# Patient Record
Sex: FEMALE | Race: WHITE | Employment: FULL TIME | URBAN - METROPOLITAN AREA
[De-identification: names, ages, dates, MRNs, and addresses within clinical notes are randomized per-mention and may not be internally consistent; named-entity substitution may affect disease eponyms.]

---

## 2017-06-03 ENCOUNTER — TELEPHONE (OUTPATIENT)
Dept: INTERNAL MEDICINE CLINIC | Facility: CLINIC | Age: 47
End: 2017-06-03

## 2017-06-03 DIAGNOSIS — Z12.31 VISIT FOR SCREENING MAMMOGRAM: Primary | ICD-10-CM

## 2017-06-05 NOTE — TELEPHONE ENCOUNTER
Per Fausto Segura from [facility], Order need to be Mammo Diagnostic bilateral with US if needed.

## 2017-06-06 NOTE — TELEPHONE ENCOUNTER
Kaycee Holm following up Dr. Yony Patino put in screening but it needs to be Diagnostic Bilateral with US If needed. .. please advise

## 2017-10-19 ENCOUNTER — HOSPITAL ENCOUNTER (OUTPATIENT)
Age: 47
Discharge: HOME | End: 2017-10-19
Payer: COMMERCIAL

## 2017-10-19 VITALS
BODY MASS INDEX: 25 KG/M2 | SYSTOLIC BLOOD PRESSURE: 110 MMHG | TEMPERATURE: 98 F | DIASTOLIC BLOOD PRESSURE: 74 MMHG | OXYGEN SATURATION: 100 % | WEIGHT: 155 LBS | HEART RATE: 64 BPM | RESPIRATION RATE: 20 BRPM

## 2017-10-19 DIAGNOSIS — S05.02XA ABRASION OF LEFT CORNEA, INITIAL ENCOUNTER: Primary | ICD-10-CM

## 2017-10-19 PROCEDURE — 99213 OFFICE O/P EST LOW 20 MIN: CPT

## 2017-10-19 PROCEDURE — 99204 OFFICE O/P NEW MOD 45 MIN: CPT

## 2017-10-19 RX ORDER — ERYTHROMYCIN 5 MG/G
1 OINTMENT OPHTHALMIC EVERY 6 HOURS
Qty: 1 G | Refills: 0 | Status: SHIPPED | OUTPATIENT
Start: 2017-10-19 | End: 2017-10-26

## 2017-10-19 NOTE — ED PROVIDER NOTES
Patient presents with:  Eye Problem      HPI:     Leila Stein is a 41year old female who presents today with a chief complaint of Gentiva inflammation, purulent drainage, and foreign body sensation to the left eye.   Patient reports yesterday she began not 6:00 to the left eye. Will treat patient for corneal abrasion and have her follow-up with primary care provider. Patient instructed to take erythromycin eye ointment as directed. Pt verbalizes plan of care and states understanding.        Orders Placed Th

## 2017-10-20 NOTE — ED NOTES
Phone call from pt stating she is unable to afford cost of medication prescribed by provider last event. Requesting change in medication to one she can afford.  Per Dr Janeen Ospina change mediation to Tobramycin Drops 1-2 drops to affected eye every 2-4 hrs [facility]

## 2022-08-15 ENCOUNTER — HOSPITAL ENCOUNTER (OUTPATIENT)
Age: 52
Discharge: HOME | End: 2022-08-15
Payer: OTHER MISCELLANEOUS

## 2022-08-15 VITALS
OXYGEN SATURATION: 100 % | SYSTOLIC BLOOD PRESSURE: 116 MMHG | RESPIRATION RATE: 20 BRPM | TEMPERATURE: 99 F | HEART RATE: 86 BPM | DIASTOLIC BLOOD PRESSURE: 74 MMHG
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
## 2022-08-15 DIAGNOSIS — Z23 NEED FOR TDAP VACCINATION: ICD-10-CM

## 2022-08-15 PROCEDURE — 90471 IMMUNIZATION ADMIN: CPT | Performed by: NURSE PRACTITIONER

## 2022-08-15 PROCEDURE — 90715 TDAP VACCINE 7 YRS/> IM: CPT | Performed by: NURSE PRACTITIONER

## 2022-08-15 PROCEDURE — 99203 OFFICE O/P NEW LOW 30 MIN: CPT | Performed by: NURSE PRACTITIONER

## 2025-04-03 ENCOUNTER — OFFICE VISIT (OUTPATIENT)
Dept: OCCUPATIONAL MEDICINE | Age: 55
End: 2025-04-03
Attending: NURSE PRACTITIONER

## 2025-04-03 DIAGNOSIS — S83.8X2A SPRAIN OF OTHER LIGAMENT OF LEFT KNEE, INITIAL ENCOUNTER: Primary | ICD-10-CM

## 2025-04-29 ENCOUNTER — OFFICE VISIT (OUTPATIENT)
Dept: PHYSICAL THERAPY | Age: 55
End: 2025-04-29
Attending: NURSE PRACTITIONER
Payer: OTHER MISCELLANEOUS

## 2025-04-29 DIAGNOSIS — S83.8X2A SPRAIN OF OTHER LIGAMENT OF LEFT KNEE, INITIAL ENCOUNTER: Primary | ICD-10-CM

## 2025-04-29 PROCEDURE — 97110 THERAPEUTIC EXERCISES: CPT

## 2025-04-29 PROCEDURE — 97162 PT EVAL MOD COMPLEX 30 MIN: CPT

## 2025-04-30 NOTE — PROGRESS NOTES
LOWER EXTREMITY EVALUATION:     Diagnosis:   Sprain of other ligament of left knee, initial encounter (S83.8X2A) Patient:  Erika Landeros (54 year old, female)        Referring Provider: Martha John  Today's Date   4/30/2025    Precautions:  None   Date of Evaluation: 04/29/25  Next MD visit: -  Date of Surgery: na     PATIENT SUMMARY   Summary of chief complaints: L knee pain since fall on ice 2/12/2025 at work  History of current condition: Pt reports falling on ice at work twisting her L knee.  She also impacted other body parts that weren't problematic for long.  She reports her symptoms have improved mildly over the past few months.  Pain decreases some with knee sleeve.   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  Significant findings include: R ACL reconstruction approx 8 yrs ago  Imaging/Tests: -   Erika  has no past medical history on file.  She  has a past surgical history that includes tonsillectomy (1982); [facility] ACL RANI Maintenance (Right, 2013); and adenoidectomy.    ASSESSMENT  rEika presents to physical therapy evaluation with primary c/o L knee pain since fall on ice 2/12/2025 at work. The results of the objective tests and measures show Weak hips, R knee varus play, L knee stable, but mildly swollen and painful. Functional deficits include but are not limited to Decreased tolerance for prolonged upright activity especially stairs. Signs and symptoms are consistent with diagnosis of Sprain of other ligament of left knee, initial encounter (S83.8X2A). Pt and PT discussed evaluation findings, pathology,  POC and HEP.  Pt voiced understanding and performs HEP correctly without reported pain. Skilled Physical Therapy is medically necessary to address the above impairments and reach functional goals.    OBJECTIVE:    Musculoskeletal  Observation: Fit woman in no apparent distress  Palpation: TTP along L knee joint line med and lat   Accessory Motion: good pat/fem mobility B; R knee varus stress reveals significant joint play vs L.     Edema: minimal L knee   Special Tests:Negative R valgus stress, Lachman and posterior drawer, positive on R for varus joint play; Negative L knee varus and valgus stress tests, negative Lachman and posterior drawer; Negative McMurrays B     ROM and Strength: (* denotes performed with pain)  Hip   ROM MMT (-/5)    R L R L     Flex (L2) WNL's WNL's 5 5     Ext  WNL's WNL's 4 4     Abd WNL's WNL's 4- 4-     ER WNL's WNL's         IR 20 WNL's        ,   Knee   ROM MMT (-/5)    R L R L     Flex WNL's WNL's 5 5     Ext (L3) WNL's WNL's 5 5       Flexibility:  LE Flexibility R L     Hip Flexor         Hamstrings -- (130) -- (130)     ITB         Piriformis         Quads -- (Prone: heel to 2\" from hip) -- (Prone: heel to 2\" from hip)     Gastroc-soleus         Balance and Functional Mobility:  Gait: pt ambulates on level ground with normal mechanics   SLS: R 30 sec,  L 30 sec     Today's Treatment and Response:   Pt education was provided on exam findings, treatment diagnosis, treatment plan, expectations, and prognosis.  Today's Treatment       4/29/2025   LE Treatment   Therapeutic Exercise - Discussed use of PRICE especially end of work day  - Discussed avoiding impact activities in the short term  - Discussed evaluation results especially weak hips for long term need  - SLS ea R, L  - Prone hip ext SLR 30x ea R, L  - Sidelying hip abd SLR 30x ea R, L   Therapeutic Exercise Minutes 25   Evaluation Minutes 20   Total Time Of Timed Procedures 25   Total Time Of Service-Based Procedures 20    Total Treatment Time 45   HEP Texted link to pt and reviewed on her phone:  Access Code: [email or web address]  URL: [email or web address]  Date: 04/29/2025  Prepared by: Kwadwo Pablo    Exercises  - Single Leg Stance  - 7 x weekly - 10 reps - 60 seconds hold  - Prone Hip Extension  - 1 x daily - 7 x weekly - 3 sets - 10 reps  - Sidelying Hip Abduction  - 1 x daily - 7 x weekly - 3 sets - 10 reps        Patient was instructed in and issued a HEP for: Texted link to pt and reviewed on her phone:  Access Code: [email or web address]  URL: [email or web address]  Date: 04/29/2025  Prepared by: Kwadwo Pablo    Exercises  - Single Leg Stance  - 7 x weekly - 10 reps - 60 seconds hold  - Prone Hip Extension  - 1 x daily - 7 x weekly - 3 sets - 10 reps  - Sidelying Hip Abduction  - 1 x daily - 7 x weekly - 3 sets - 10 reps    Charges:  PT EVAL: Moderate Complexity, Ther Ex x2  In agreement with evaluation findings and clinical rationale, this evaluation involved MODERATE COMPLEXITY decision making due to 1-2 personal factors/comorbidities, 3 or more body structures involved/activity limitations, and evolving symptoms as documented in the evaluation.                                                                                                                PLAN OF CARE:    Goals: (to be met in 8 visits)   Pt report min/no pain  Pt report no functional limitations at work or otherwise including climbing stairs  Pt demo independence with HEP  Pt demo MMT B hips 4+/5 all directions     Frequency / Duration: Patient will be seen 2x/week or a total of 8  visits over a 90 day period. Treatment will include: Gait training; Manual Therapy; Neuromuscular Re-education; Therapeutic Activities; Therapeutic Exercise; Home Exercise Program instruction    Education or treatment limitation: None   Rehab Potential: excellent     LEFS Score  LEFS Score: (Patient-Rptd) 67.5 % (4/29/2025  6:15  PM)      Patient/Family/Caregiver was advised of these findings, precautions, and treatment options and has agreed to actively participate in planning and for this course of care.    Thank you for your referral. Please co-sign or sign and return this letter via fax as soon as possible to [phone]. If you have any questions, please contact me at Dept: [phone]    Sincerely,  Electronically signed by therapist: Kwadwo Pablo PT  Physician's certification required: Yes  I certify the need for these services furnished under this plan of treatment and while under my care.    X___________________________________________________ Date____________________    Certification From: 4/30/2025  To:7/29/2025

## 2025-05-01 ENCOUNTER — APPOINTMENT (OUTPATIENT)
Dept: PHYSICAL THERAPY | Age: 55
End: 2025-05-01
Attending: NURSE PRACTITIONER
Payer: OTHER MISCELLANEOUS

## 2025-05-06 ENCOUNTER — OFFICE VISIT (OUTPATIENT)
Dept: PHYSICAL THERAPY | Age: 55
End: 2025-05-06
Attending: NURSE PRACTITIONER
Payer: OTHER MISCELLANEOUS

## 2025-05-06 PROCEDURE — 97110 THERAPEUTIC EXERCISES: CPT

## 2025-05-06 NOTE — PROGRESS NOTES
Patient: Erika Landeros (54 year old, female) Referring Provider:  Insurance:   Diagnosis: Sprain of other ligament of left knee, initial encounter (S83.8X2A) Martha WELLS COMP   Date of Surgery: na Next MD visit:  N/A   Precautions:  None - Referral Information:    Date of Evaluation: Req: 8, Auth: 8, Exp: 4/3/2026    04/29/25 POC Auth Visits:  8       Today's Date   5/6/2025    Subjective  Some fatigue and pain in her knees as she did a lot of stairs today.       Pain: 0/10     Objective  See below treatment grid            Assessment  Good tolerance for new exercises with c/o muscle soreness in quads and hip abd's.    Goals (to be met in 8 visits)   Pt report min/no pain  Pt report no functional limitations at work or otherwise including climbing stairs  Pt demo independence with HEP  Pt demo MMT B hips 4+/5 all directions       Plan  Progress LE/hip strengthening as able.    Treatment Last 4 Visits  Treatment Day: 2       4/29/2025 5/6/2025   LE Treatment   Therapeutic Exercise - Discussed use of PRICE especially end of work day  - Discussed avoiding impact activities in the short term  - Discussed evaluation results especially weak hips for long term need  - SLS ea R, L  - Prone hip ext SLR 30x ea R, L  - Sidelying hip abd SLR 30x ea R, L - Upright bike x 6'  - Stretches on steps: hamstrings, knee flexion, quads  - Shuttle B leg press 6b x30; R/L leg press 5b 3x10 ea  - SLS on level, eyes open ea R, L  - prone hip ext SLR 3x10 ea R, L  - sidelying hip abd SLR 3x10 ea R, L   Therapeutic Exercise Minutes 25 45   Evaluation Minutes 20    Total Time Of Timed Procedures 25 45   Total Time Of Service-Based Procedures 20 0   Total Treatment Time 45 45   HEP Texted link to pt and reviewed on her phone:  Access Code: [email or web address]  URL: [email or web address]  Date: 04/29/2025  Prepared by: Kwadwo Pablo    Exercises  - Single Leg Stance  - 7 x weekly - 10 reps - 60 seconds hold  - Prone Hip Extension   - 1 x daily - 7 x weekly - 3 sets - 10 reps  - Sidelying Hip Abduction  - 1 x daily - 7 x weekly - 3 sets - 10 reps Access Code: [email or web address]  URL: [email or web address]          HEP  Access Code: [email or web address]  URL: [email or web address]      Charges     Ther Ex x3

## 2025-05-08 ENCOUNTER — OFFICE VISIT (OUTPATIENT)
Dept: PHYSICAL THERAPY | Age: 55
End: 2025-05-08
Attending: NURSE PRACTITIONER
Payer: OTHER MISCELLANEOUS

## 2025-05-08 PROCEDURE — 97110 THERAPEUTIC EXERCISES: CPT

## 2025-05-08 NOTE — PROGRESS NOTES
Patient: Erika Landeros (54 year old, female) Referring Provider:  Insurance:   Diagnosis: Sprain of other ligament of left knee, initial encounter (S83.8X2A) Martha WELLS COMP   Date of Surgery: na Next MD visit:  N/A   Precautions:  None - Referral Information:    Date of Evaluation: Req: 8, Auth: 8, Exp: 4/3/2026    04/29/25 POC Auth Visits:  8       Today's Date   5/8/2025    Subjective  Knees doing a little better actually       Pain: 0/10     Objective  See below treatment grid            Assessment  Mild symptom improvement, but still end range pain with L knee flexion.    Goals (to be met in 8 visits)   Pt report min/no pain  Pt report no functional limitations at work or otherwise including climbing stairs  Pt demo independence with HEP  Pt demo MMT B hips 4+/5 all directions         Plan  Progress LE/hip strengthening as able.    Treatment Last 4 Visits  Treatment Day: 3       4/29/2025 5/6/2025 5/8/2025   LE Treatment   Therapeutic Exercise - Discussed use of PRICE especially end of work day  - Discussed avoiding impact activities in the short term  - Discussed evaluation results especially weak hips for long term need  - SLS ea R, L  - Prone hip ext SLR 30x ea R, L  - Sidelying hip abd SLR 30x ea R, L - Upright bike x 6'  - Stretches on steps: hamstrings, knee flexion, quads  - Shuttle B leg press 6b x30; R/L leg press 5b 3x10 ea  - SLS on level, eyes open ea R, L  - prone hip ext SLR 3x10 ea R, L  - sidelying hip abd SLR 3x10 ea R, L - Upright bike x 6'   - Stretches on steps: hamstrings, knee flexion, quads   - Shuttle B leg press 6b x30; R/L leg press 5b 3x10 ea   - SLS on level, eyes open ea R, L   - prone hip ext SLR 3x10 ea R, L   - sidelying hip abd SLR 3x10 ea R, L   - Supine hip flex SLR 3x10 ea R, L   Therapeutic Exercise Minutes 25 45 40   Evaluation Minutes 20     Total Time Of Timed Procedures 25 45 40   Total Time Of Service-Based Procedures 20 0 0   Total Treatment Time 45 45 40   HEP  Texted link to pt and reviewed on her phone:  Access Code: [email or web address]  URL: [email or web address]  Date: 04/29/2025  Prepared by: Kwadwo Pablo    Exercises  - Single Leg Stance  - 7 x weekly - 10 reps - 60 seconds hold  - Prone Hip Extension  - 1 x daily - 7 x weekly - 3 sets - 10 reps  - Sidelying Hip Abduction  - 1 x daily - 7 x weekly - 3 sets - 10 reps Access Code: [email or web address]  URL: [email or web address]           HEP  Access Code: [email or web address]  URL: [email or web address]      Charges     Ther Ex x3

## 2025-05-13 ENCOUNTER — OFFICE VISIT (OUTPATIENT)
Dept: PHYSICAL THERAPY | Age: 55
End: 2025-05-13
Attending: NURSE PRACTITIONER
Payer: OTHER MISCELLANEOUS

## 2025-05-13 PROCEDURE — 97110 THERAPEUTIC EXERCISES: CPT

## 2025-05-13 NOTE — PROGRESS NOTES
Patient: Erika Landeros (54 year old, female) Referring Provider:  Insurance:   Diagnosis: Sprain of other ligament of left knee, initial encounter (S83.8X2A) Martha WELLS COMP   Date of Surgery: na Next MD visit:  N/A   Precautions:  None - Referral Information:    Date of Evaluation: Req: 8, Auth: 8, Exp: 4/3/2026    04/29/25 POC Auth Visits:  8       Today's Date   5/13/2025    Subjective  Continued knee pain, especially at end of work day.       Pain: 0/10     Objective  See below treatment grid            Assessment  Continued mild pain, but tolerating all challenges presented.    Goals (to be met in 8 visits)   Pt report min/no pain  Pt report no functional limitations at work or otherwise including climbing stairs  Pt demo independence with HEP  Pt demo MMT B hips 4+/5 all directions           Plan  Progress LE/hip strengthening as able.    Treatment Last 4 Visits  Treatment Day: 4       4/29/2025 5/6/2025 5/8/2025 5/13/2025   LE Treatment   Therapeutic Exercise - Discussed use of PRICE especially end of work day  - Discussed avoiding impact activities in the short term  - Discussed evaluation results especially weak hips for long term need  - SLS ea R, L  - Prone hip ext SLR 30x ea R, L  - Sidelying hip abd SLR 30x ea R, L - Upright bike x 6'  - Stretches on steps: hamstrings, knee flexion, quads  - Shuttle B leg press 6b x30; R/L leg press 5b 3x10 ea  - SLS on level, eyes open ea R, L  - prone hip ext SLR 3x10 ea R, L  - sidelying hip abd SLR 3x10 ea R, L - Upright bike x 6'   - Stretches on steps: hamstrings, knee flexion, quads   - Shuttle B leg press 6b x30; R/L leg press 5b 3x10 ea   - SLS on level, eyes open ea R, L   - prone hip ext SLR 3x10 ea R, L   - sidelying hip abd SLR 3x10 ea R, L   - Supine hip flex SLR 3x10 ea R, L - Upright bike x 6'   - Stretches on steps: hamstrings, knee flexion, quads   - Shuttle B leg press 6b x30; R/L leg press 5b 3x10 ea   - SLS on level, eyes open ea R, L   -  prone hip ext SLR 3x10 ea R, L   - sidelying hip abd SLR 3x10 ea R, L   - supine hip flex SLR 3x10 ea R, L  - Partial squat (hip tap on mat table) 10x ea at 3 different heights  - Sit-to-Stand R, L from high mat table 10x ea     Therapeutic Exercise Minutes 25 45 40 40   Evaluation Minutes 20      Total Time Of Timed Procedures 25 45 40 40   Total Time Of Service-Based Procedures 20 0 0 0   Total Treatment Time 45 45 40 40   HEP Texted link to pt and reviewed on her phone:  Access Code: [email or web address]  URL: [email or web address]  Date: 04/29/2025  Prepared by: Kwadwo Pablo    Exercises  - Single Leg Stance  - 7 x weekly - 10 reps - 60 seconds hold  - Prone Hip Extension  - 1 x daily - 7 x weekly - 3 sets - 10 reps  - Sidelying Hip Abduction  - 1 x daily - 7 x weekly - 3 sets - 10 reps Access Code: [email or web address]  URL: [email or web address]            HEP  Access Code: [email or web address]  URL: [email or web address]      Charges     Ther Ex x3

## 2025-05-15 ENCOUNTER — OFFICE VISIT (OUTPATIENT)
Dept: PHYSICAL THERAPY | Age: 55
End: 2025-05-15
Attending: NURSE PRACTITIONER
Payer: OTHER MISCELLANEOUS

## 2025-05-15 PROCEDURE — 97110 THERAPEUTIC EXERCISES: CPT

## 2025-05-15 NOTE — PROGRESS NOTES
Patient: Erika Landeros (54 year old, female) Referring Provider:  Insurance:   Diagnosis: Sprain of other ligament of left knee, initial encounter (S83.8X2A) Martha HOLLY   Date of Surgery: na Next MD visit:  N/A   Precautions:  None - Referral Information:    Date of Evaluation: Req: 8, Auth: 8, Exp: 4/3/2026    04/29/25 POC Auth Visits:  8       Today's Date   5/15/2025    Subjective  Continued pain, but mostly tired today.       Pain: 0/10     Objective  See below treatment grid          Assessment  Increasing strength of hips/LE's with only minor c/o pain.    Goals (to be met in 8 visits)   Pt report min/no pain  Pt report no functional limitations at work or otherwise including climbing stairs  Pt demo independence with HEP  Pt demo MMT B hips 4+/5 all directions             Plan  Progress LE/hip strengthening as able.    Treatment Last 4 Visits  Treatment Day: 5       5/6/2025 5/8/2025 5/13/2025 5/15/2025   LE Treatment   Therapeutic Exercise - Upright bike x 6'  - Stretches on steps: hamstrings, knee flexion, quads  - Shuttle B leg press 6b x30; R/L leg press 5b 3x10 ea  - SLS on level, eyes open ea R, L  - prone hip ext SLR 3x10 ea R, L  - sidelying hip abd SLR 3x10 ea R, L - Upright bike x 6'   - Stretches on steps: hamstrings, knee flexion, quads   - Shuttle B leg press 6b x30; R/L leg press 5b 3x10 ea   - SLS on level, eyes open ea R, L   - prone hip ext SLR 3x10 ea R, L   - sidelying hip abd SLR 3x10 ea R, L   - Supine hip flex SLR 3x10 ea R, L - Upright bike x 6'   - Stretches on steps: hamstrings, knee flexion, quads   - Shuttle B leg press 6b x30; R/L leg press 5b 3x10 ea   - SLS on level, eyes open ea R, L   - prone hip ext SLR 3x10 ea R, L   - sidelying hip abd SLR 3x10 ea R, L   - supine hip flex SLR 3x10 ea R, L  - Partial squat (hip tap on mat table) 10x ea at 3 different heights  - Sit-to-Stand R, L from high mat table 10x ea   - Upright bike x 6'   - Stretches on steps: hamstrings,  knee flexion, quads   - Shuttle B leg press 6b x30; R/L leg press 4b 3x10 ea   - SLS on level, eyes open ea R, L   - prone hip ext SLR 3x10 ea R, L   - sidelying hip abd SLR 3x10 ea R, L   - supine hip flex SLR 3x10 ea R, L   - Partial squat (hip tap on mat table) 30x ea   - Sit-to-Stand R, L from mat table 30x ea      Therapeutic Exercise Minutes 45 40 40 40   Total Time Of Timed Procedures 45 40 40 40   Total Time Of Service-Based Procedures 0 0 0 0   Total Treatment Time 45 40 40 40   HEP Access Code: [email or web address]  URL: [email or web address]             HEP  Access Code: [email or web address]  URL: [email or web address]      Charges     Ther Ex x3

## 2025-05-21 ENCOUNTER — OFFICE VISIT (OUTPATIENT)
Dept: PHYSICAL THERAPY | Age: 55
End: 2025-05-21
Payer: OTHER MISCELLANEOUS

## 2025-05-21 PROCEDURE — 97110 THERAPEUTIC EXERCISES: CPT

## 2025-05-21 NOTE — PROGRESS NOTES
Patient: Erika Landeros (54 year old, female) Referring Provider:  Insurance:   Diagnosis: Sprain of other ligament of left knee, initial encounter (S83.8X2A) Martha HOLLY   Date of Surgery: na Next MD visit:  N/A   Precautions:  None - Referral Information:    Date of Evaluation: Req: 8, Auth: 8, Exp: 4/3/2026    04/29/25 POC Auth Visits:  8       Today's Date   5/21/2025    Subjective  Knee is a bit tired today, not so much pain as soreness/fatigue.       Pain: 0/10     Objective  See below treatment grid            Assessment  Slowly improving subjective complaints, strength and function.    Goals (to be met in 8 visits)   Pt report min/no pain  Pt report no functional limitations at work or otherwise including climbing stairs  Pt demo independence with HEP  Pt demo MMT B hips 4+/5 all directions               Plan  Progress LE/hip strengthening as able.    Treatment Last 4 Visits  Treatment Day: 6       5/8/2025 5/13/2025 5/15/2025 5/21/2025   LE Treatment   Therapeutic Exercise - Upright bike x 6'   - Stretches on steps: hamstrings, knee flexion, quads   - Shuttle B leg press 6b x30; R/L leg press 5b 3x10 ea   - SLS on level, eyes open ea R, L   - prone hip ext SLR 3x10 ea R, L   - sidelying hip abd SLR 3x10 ea R, L   - Supine hip flex SLR 3x10 ea R, L - Upright bike x 6'   - Stretches on steps: hamstrings, knee flexion, quads   - Shuttle B leg press 6b x30; R/L leg press 5b 3x10 ea   - SLS on level, eyes open ea R, L   - prone hip ext SLR 3x10 ea R, L   - sidelying hip abd SLR 3x10 ea R, L   - supine hip flex SLR 3x10 ea R, L  - Partial squat (hip tap on mat table) 10x ea at 3 different heights  - Sit-to-Stand R, L from high mat table 10x ea   - Upright bike x 6'   - Stretches on steps: hamstrings, knee flexion, quads   - Shuttle B leg press 6b x30; R/L leg press 4b 3x10 ea   - SLS on level, eyes open ea R, L   - prone hip ext SLR 3x10 ea R, L   - sidelying hip abd SLR 3x10 ea R, L   - supine hip  flex SLR 3x10 ea R, L   - Partial squat (hip tap on mat table) 30x ea   - Sit-to-Stand R, L from mat table 30x ea    - Upright bike x 6'   - Stretches on steps: hamstrings, knee flexion, quads   - Shuttle B leg press 6b x30; R/L leg press 6b 3x10 ea   - SLS on level, eyes open ea R, L   - prone hip ext SLR 3x10 ea R, L   - sidelying hip abd SLR 3x10 ea R, L   - supine hip flex SLR 3x10 ea R, L   - Partial squat (hip tap on mat table) 30x ea   - Sit-to-Stand R, L from mat table 30x ea   - Partial fwd lunges 20x ea R, L     Therapeutic Exercise Minutes 40 40 40 40   Total Time Of Timed Procedures 40 40 40 40   Total Time Of Service-Based Procedures 0 0 0 0   Total Treatment Time 40 40 40 40        HEP  Access Code: [email or web address]  URL: [email or web address]      Charges     Ther Ex x3

## 2025-05-28 ENCOUNTER — OFFICE VISIT (OUTPATIENT)
Dept: PHYSICAL THERAPY | Age: 55
End: 2025-05-28
Payer: OTHER MISCELLANEOUS

## 2025-05-28 PROCEDURE — 97110 THERAPEUTIC EXERCISES: CPT

## 2025-05-28 NOTE — PROGRESS NOTES
Patient: Erika Landeros (54 year old, female) Referring Provider:  Insurance:   Diagnosis: Sprain of other ligament of left knee, initial encounter (S83.8X2A) Martha WELLS COMP   Date of Surgery: na Next MD visit:  N/A   Precautions:  None - Referral Information:    Date of Evaluation: Req: 8, Auth: 8, Exp: 4/3/2026    04/29/25 POC Auth Visits:  8       Today's Date   5/28/2025    Subjective  Knee doing a bit better, but consistently really tired by the end of her work day and goes to sleep.  Not so bad that she ices it.       Pain: 0/10     Objective  See below treatment grid          Assessment  Improving strength, balance and activity tolerance.    Goals (to be met in 8 visits)   Pt report min/no pain  Pt report no functional limitations at work or otherwise including climbing stairs  Pt demo independence with HEP  Pt demo MMT B hips 4+/5 all directions                 Plan  Progress LE/hip strengthening as able. Final visit scheduled for next week.    Treatment Last 4 Visits  Treatment Day: 7       5/13/2025 5/15/2025 5/21/2025 5/28/2025   LE Treatment   Therapeutic Exercise - Upright bike x 6'   - Stretches on steps: hamstrings, knee flexion, quads   - Shuttle B leg press 6b x30; R/L leg press 5b 3x10 ea   - SLS on level, eyes open ea R, L   - prone hip ext SLR 3x10 ea R, L   - sidelying hip abd SLR 3x10 ea R, L   - supine hip flex SLR 3x10 ea R, L  - Partial squat (hip tap on mat table) 10x ea at 3 different heights  - Sit-to-Stand R, L from high mat table 10x ea   - Upright bike x 6'   - Stretches on steps: hamstrings, knee flexion, quads   - Shuttle B leg press 6b x30; R/L leg press 4b 3x10 ea   - SLS on level, eyes open ea R, L   - prone hip ext SLR 3x10 ea R, L   - sidelying hip abd SLR 3x10 ea R, L   - supine hip flex SLR 3x10 ea R, L   - Partial squat (hip tap on mat table) 30x ea   - Sit-to-Stand R, L from mat table 30x ea    - Upright bike x 6'   - Stretches on steps: hamstrings, knee flexion,  quads   - Shuttle B leg press 6b x30; R/L leg press 6b 3x10 ea   - SLS on level, eyes open ea R, L   - prone hip ext SLR 3x10 ea R, L   - sidelying hip abd SLR 3x10 ea R, L   - supine hip flex SLR 3x10 ea R, L   - Partial squat (hip tap on mat table) 30x ea   - Sit-to-Stand R, L from mat table 30x ea   - Partial fwd lunges 20x ea R, L   - Upright bike x 6'   - Stretches on steps: hamstrings, knee flexion, quads   - Shuttle B leg press 6b x30; R/L leg press 5b 3x10 ea   - SLS on level, eyes open ea R, L 60\" ea x 2  - prone hip ext SLR 3x10 ea R, L   - sidelying hip abd SLR 3x10 ea R, L   - supine hip flex SLR 3x10 ea R, L   - Single leg Partial squat (hip tap on mat table) 30x ea   - Partial squat 30x  - Partial fwd lunges 30x ea R, L      Therapeutic Exercise Minutes 40 40 40 40   Total Time Of Timed Procedures 40 40 40 40   Total Time Of Service-Based Procedures 0 0 0 0   Total Treatment Time 40 40 40 40        HEP  Access Code: [email or web address]  URL: [email or web address]      Charges     Ther Ex x3

## 2025-06-05 ENCOUNTER — OFFICE VISIT (OUTPATIENT)
Dept: PHYSICAL THERAPY | Age: 55
End: 2025-06-05
Payer: OTHER MISCELLANEOUS

## 2025-06-05 PROCEDURE — 97110 THERAPEUTIC EXERCISES: CPT

## 2025-06-06 NOTE — PROGRESS NOTES
Patient: Erika Landeros (54 year old, female) Referring Provider:  Insurance:   Diagnosis: Sprain of other ligament of left knee, initial encounter (S83.8X2A) [facility]   Date of Surgery: na Next MD visit:  N/A   Precautions:  None - Referral Information:    Date of Evaluation: Req: 8, Auth: 8, Exp: 4/3/2026    04/29/25 POC Auth Visits:  8       Today's Date   6/5/2025    Subjective  Doing ok.  Still some pain, but mostly fatigued since it is the end of her workday.  Overall she describes her knee at 85-90% of her pre-injury status.  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       Pain: 0/10     Objective  See below treatment grid            Assessment  Much improved.    Goals - Met     Plan  DC to [facility].    Treatment Last 4 Visits  Treatment Day: 8       5/15/2025 5/21/2025 5/28/2025 6/5/2025   LE Treatment   Therapeutic Exercise - Upright bike x 6'   - Stretches on steps: hamstrings, knee flexion, quads   - Shuttle B leg press 6b x30; R/L leg press 4b 3x10 ea   - SLS on level, eyes open ea R, L   - prone hip ext SLR 3x10 ea R, L   - sidelying hip abd SLR 3x10 ea R, L   - supine hip flex SLR 3x10 ea R, L   - Partial squat (hip tap on mat table) 30x ea   - Sit-to-Stand R, L from mat table 30x ea    - Upright bike x 6'   - Stretches on steps: hamstrings, knee flexion, quads   - Shuttle B leg press 6b x30; R/L leg press 6b 3x10 ea   - SLS on level, eyes open ea R, L   - prone hip ext SLR 3x10 ea R, L   - sidelying hip abd SLR 3x10 ea R, L   - supine hip flex SLR 3x10 ea R, L   - Partial squat (hip tap on mat table) 30x ea   - Sit-to-Stand R, L from mat table 30x ea   - Partial fwd lunges 20x ea R, L   - Upright bike x 6'   - Stretches on steps: hamstrings, knee flexion, quads   - Shuttle B leg press 6b x30; R/L leg press 5b 3x10 ea   - SLS on level, eyes open ea R, L 60\" ea x 2  - prone hip ext SLR 3x10 ea R, L   - sidelying hip abd SLR 3x10 ea R, L   - supine hip flex SLR 3x10 ea R, L   -  Single leg Partial squat (hip tap on mat table) 30x ea   - Partial squat 30x  - Partial fwd lunges 30x ea R, L    - Upright bike x 6'   - Stretches on steps: hamstrings, knee flexion, quads   - Shuttle B leg press 6b x30; R/L leg press 5b 3x10 ea   - SLS on level, eyes open ea R, L 60\" ea x 2   - prone hip ext SLR 3x10 ea R, L   - sidelying hip abd SLR 3x10 ea R, L   - supine hip flex SLR 3x10 ea R, L   - Single leg Partial squat (hip tap on mat table) 30x ea   - Partial squat 30x   - Partial fwd lunges 30x ea R, L      Therapeutic Exercise Minutes 40 40 40 40   Total Time Of Timed Procedures 40 40 40 40   Total Time Of Service-Based Procedures 0 0 0 0   Total Treatment Time 40 40 40 40        [facility]  Access Code: [email or web address]  URL: [email or web address]      Charges     Ther Ex x3